# Patient Record
(demographics unavailable — no encounter records)

---

## 2024-12-23 NOTE — PHYSICAL EXAM
[de-identified] : Constitutional:  no acute distress and well-appearing Eyes:  normal sclera/conjunctiva and pupils equal round and reactive to light ENT:  the outer ears and nose were normal in appearance, the oropharynx was normal and both tympanic membranes were normal Lymph: no posterior cervical lymphadenopathy and no anterior cervical lymphadenopathy Pulmonary:  no respiratory distress, lungs were clear to auscultation bilaterally Cardiac:  normal rate, normal S1 and S2 and no murmur heard Vascular:  there was no peripheral edema Abdomen:  abdomen soft, non-tender and normal bowel sounds Genitourinary: deferred at this time Musculoskeletal:  no joint swelling and grossly normal strength/tone Skin: no rash or skin lesions visualized Neurology:  coordination grossly intact and no focal deficits Psychiatric:  the affect was normal and the mood was normal

## 2024-12-23 NOTE — HEALTH RISK ASSESSMENT
[Good] : ~his/her~  mood as  good [No falls in past year] : Patient reported no falls in the past year [0] : 2) Feeling down, depressed, or hopeless: Not at all (0) [PHQ-2 Negative - No further assessment needed] : PHQ-2 Negative - No further assessment needed [No] : In the past 12 months have you used drugs other than those required for medical reasons? No [Time Spent: ___ Minutes] : I spent [unfilled] minutes performing a depression screening for this patient. [Never] : Never [No Retinopathy] : No retinopathy [Patient reported mammogram was normal] : Patient reported mammogram was normal [Patient reported PAP Smear was normal] : Patient reported PAP Smear was normal [Patient reported colonoscopy was normal] : Patient reported colonoscopy was normal [None] : None [With Family] : lives with family [Feels Safe at Home] : Feels safe at home [Fully functional (bathing, dressing, toileting, transferring, walking, feeding)] : Fully functional (bathing, dressing, toileting, transferring, walking, feeding) [Fully functional (using the telephone, shopping, preparing meals, housekeeping, doing laundry, using] : Fully functional and needs no help or supervision to perform IADLs (using the telephone, shopping, preparing meals, housekeeping, doing laundry, using transportation, managing medications and managing finances) [Reports changes in vision] : Reports changes in vision [Retired] : retired [] :  [# Of Children ___] : has [unfilled] children [Audit-CScore] : 0 [de-identified] : walking [de-identified] : eating homecooked fruits and vegetables [[facility]] : 7 [[id number]] : 0 [EyeExamDate] : 2024 [Change in mental status noted] : No change in mental status noted [Reports changes in hearing] : Reports no changes in hearing [Reports changes in dental health] : Reports no changes in dental health [MammogramDate] : 08/24 [PapSmearDate] : 2024 [ColonoscopyDate] : 2019 [ColonoscopyComments] : normal repeat in 10 years [de-identified] : with  and two adult children [FreeTextEntry2] :

## 2024-12-23 NOTE — ASSESSMENT
[FreeTextEntry1] : 62 yo F w PMH HLD, prediabetes, OAB, ANI, glaucoma presenting to establish care and for CPE.  Return to clinic in 4-6 months to follow up for chronic conditions.  Healthcare Maintenance: Diet and exercise encouraged Medical history reviewed and updated Medication reconciliation done; refills ordered as needed Routine labs ordered Vaccinations reviewed; Tdap: 2022? UTD  Shingrix: declines until next visit  Influenza: declines this visit Mammogram: 08/24. Patient reported mammogram was normal.   PAP Smear: 2024. Patient reported PAP Smear was normal.   Colonoscopy: 2019. Patient reported colonoscopy was normal. normal repeat in 10 years.   Stay up to date with ophthalmology and dental   HLD -repeat labs today -was unable to tolerate medications for HLD in the past  Prediabetes -repeat a1c today  Glaucoma -following w ophtho -taking xiidra eyedrops  OAB/ANI -following w urology; symptoms well controlled  Thyroid nodule -incidentally found on MRI C spine for neck pain -ordered for thyroid US  Cervical Pain w Radiation to L Arm -had MRI spine w outside doctor showing degenerative changes -patient requesting new orthopedic surgery referral -using Tylenol/ibuprofen PRN -can consider gabapentin for neuropathic pain if persists and no plans for further intervention  L Rib Pain w Associated SOB -likely costochondritis -advised to take ibuprofen PRN -obtain CXR for further evaluation  Insomnia -discussed sleep hygiene and advised to maintain sleep diary

## 2024-12-23 NOTE — HISTORY OF PRESENT ILLNESS
[FreeTextEntry1] : Establish care and CPE [de-identified] : 62 yo F w PMH HLD, prediabetes, OAB, ANI, glaucoma presenting to establish care and for CPE.  present for encounter as well.  Last seen by [facility] PCP Dr. Heard; last seen in June for follow up of HLD and prediabetes Urology: following for OAB and ANI; not requiring medications Orthopedic/Pain Management: seeing for L shoulder and back pain; was getting injections; did PT in the past; now has pain in neck and some weakness/numbness in L arm; did MRI cervical spine w L thyroid nodule; was told to f/u w PCP and very concerned Has insomnia with both falling and staying asleep; lost sister to cancer recently as well Has L sided rib pain and sternal pain as well w some associated SOB when falling asleep

## 2025-01-09 NOTE — DISCUSSION/SUMMARY
[de-identified] : Left cervical radiculopathy. Discussed all options. Dizziness. No cervical spine surgery needed.  Referred to Dr. Kenzie Nunez and Dr Harris for neurological evaluation. All options discussed including rest, medicine, home exercise, acupuncture, Chiropractic care, Physical Therapy, Pain management, and last resort surgery. All questions were answered, all alternatives discussed, and the patient is in complete agreement with the treatment plan which the patient contributed to and discussed with me through the shared decision-making process. Follow-up appointment as instructed. Any issues and the patient will call or come in sooner.

## 2025-01-09 NOTE — ADDENDUM
[FreeTextEntry1] : This note was written by Carlos Hernandez on 01/09/2025 acting as scribe for Dr. Claudy Hogue M.D.  I, Claudy Hogue MD, have read and attest that all the information, medical decision making and discharge instructions within are true and accurate.

## 2025-01-09 NOTE — HISTORY OF PRESENT ILLNESS
[Stable] : stable [de-identified] : 61 year old RHD female presents for initial evaluation of neck pain x 7 months, has been worsening. Denies injury. The pain radiates down the LUE to the fingers, with numbness/tingling. States that she drops items. She also notes some gait instability. She takes advil and tylenol for the pain.  No recent PT or chiropractic care. Denies JALEN.  Has MRI Cervical.  She also complains of midsternal pain x  2 years. She notes that XRs done which were negative.  PMHx: HLD, pre-DM, HTN  She works in .  No fever, chills, sweats, nausea/vomiting. No bowel or bladder dysfunction, no recent weight loss or gain. No night pain. This history is in addition to the intake form that I personally reviewed.

## 2025-01-09 NOTE — PHYSICAL EXAM
[Normal] : Gait: normal [Romero's Sign] : negative Romero's sign [Pronator Drift] : negative pronator drift [SLR] : negative straight leg raise [de-identified] : 5 out of 5 motor strength, sensation is intact and symmetrical full range of motion flexion extension and rotation, no palpatory tenderness full range of motion of hips knees shoulders and elbows (all four extremities), no atrophy, negative straight leg raise, no pathological reflexes, no swelling, normal ambulation, no apparent distress skin is intact, no palpable lymph nodes, no upper or lower extremity instability, alert and oriented x3 and normal mood. Normal finger-to nose test.  No upper motor neuron findings. [de-identified] : I reviewed, interpreted and clinically correlated the following outside imaging studies, MRI Cervical 12/19/24  ([facility])  IMPRESSION:    Cervical disc degeneration without significant foraminal or canal stenosis at any level.   Ellipsoid T2 hyperintensity in the left thyroid lobe; correlation with thyroid ultrasound recommended.      FINDINGS:   ALIGNMENT: Anatomic.   VERTEBRAE: Vertebral body heights preserved. Marrow signal is within normal limits.   DISCS: Minimal disc degeneration.   CORD: No abnormality of cord signal or caliber.   PARAVERTEBRAL SOFT TISSUES: Ellipsoid 1.1 x 0.8 x 0.8 cm T2 hyperintensity in the left thyroid; correlation with thyroid ultrasound recommended..   EVALUATION OF INDIVIDUAL LEVELS:  C2-3: No disc herniation, canal or foraminal stenosis.   C3-4: No disc herniation, canal or foraminal stenosis.   C4-5: No disc herniation, canal or foraminal stenosis.   C5-6: No disc herniation. Minimal right uncovertebral joint osteoarthritis without foraminal stenosis. No canal stenosis.   C6-7: Subtle central disc protrusion. No foraminal or canal stenosis.   C7-T1: No disc herniation, foraminal or canal stenosis.

## 2025-01-15 NOTE — DISCUSSION/SUMMARY
[EKG obtained to assist in diagnosis and management of assessed problem(s)] : EKG obtained to assist in diagnosis and management of assessed problem(s) [FreeTextEntry1] : In summary   Jami, 61 year old with a past medical history of Hyperlipidemia, Probable Type IIA FH (FHx HL - in Mother/Father/Sister/Brother ), Pre-T2DM, Statin Intolerance to Atorva/Rosuva for > 8 wks tried twice =============== =============== Hyperlipidemia, Probable Type IIA FH (FHx HL - in Mother/Father/Sister/Brother ), Pre-T2DM, Statin Intolerance to Atorva/Rosuva for > 8 wks tried twice - Discussed Diet & Exercise - Discussed Family Screening - Start PCSK9i - Check CRP/Lpa  CP - some anginal elements - Check CTA Coronaries - CP Intermittent - difficulty walking - RFs for angina - too young for radiation induced by nuclear compared to CT   Mn Systolic Murmur (holosystolic) I.VI apical c/f MR - TTE     Li Min, kind thanks for the referral.   Jose Eduardo Mcdonough MD [facility] JUSTIN LOPEZ Director, Preventive Cardiology [facility] Cardiovascular [place]                                                                                                                                                                                                                                                                     --                                                                                                                                                                                                                                                                                                                                                                                                                                                                                                                                                                                                                                                                                                                            ----------- 60 minutes spent in patient encounter explaining and formulating rationale for treatment plan. >50% of time spent in direct counseling reviewing all tests, labs, and imaging and conferring with patient, family member, and other physicians regarding patient care

## 2025-01-15 NOTE — DISCUSSION/SUMMARY
[EKG obtained to assist in diagnosis and management of assessed problem(s)] : EKG obtained to assist in diagnosis and management of assessed problem(s) [FreeTextEntry1] : In summary   Jami, 61 year old with a past medical history of Hyperlipidemia, Probable Type IIA FH (FHx HL - in Mother/Father/Sister/Brother ), Pre-T2DM, Statin Intolerance to Atorva/Rosuva for > 8 wks tried twice =============== =============== Hyperlipidemia, Probable Type IIA FH (FHx HL - in Mother/Father/Sister/Brother ), Pre-T2DM, Statin Intolerance to Atorva/Rosuva for > 8 wks tried twice - Discussed Diet & Exercise - Discussed Family Screening - Start PCSK9i - Check CRP/Lpa  CP - some anginal elements - Check CTA Coronaries - CP Intermittent - difficulty walking - RFs for angina - too young for radiation induced by nuclear compared to CT   Mn Systolic Murmur (holosystolic) I.VI apical c/f MR - TTE     Li Min, kind thanks for the referral.   Jose Eduardo Mcdonough MD [facility] JUSTIN LOPEZ Director, Preventive Cardiology [facility] Cardiovascular [place]                                                                                                                                                                                                                                                                     --                                                                                                                                                                                                                                                                                                                                                                                                                                                                                                                                                                                                                                                                                                                            ----------- 60 minutes spent in patient encounter explaining and formulating rationale for treatment plan. >50% of time spent in direct counseling reviewing all tests, labs, and imaging and conferring with patient, family member, and other physicians regarding patient care

## 2025-01-15 NOTE — HISTORY OF PRESENT ILLNESS
[FreeTextEntry1] : Dear Patrica Romero,   I had the pleasure of seeing your patient MARGARET MORENO for Cardiometabolic evaluation.   As you know, she  is a Pleasant, 61 year old with a past medical history of Hyperlipidemia, Probable Type IIA FH (FHx HL - in Mother/Father/Sister/Brother ), Pre-T2DM, Statin Intolerance to Atorva/Rosuva for > 8 wks tried twice =============== =============== Hyperlipidemia, Probable Type IIA FH (FHx HL - in Mother/Father/Sister/Brother ), Pre-T2DM, Statin Intolerance to Atorva/Rosuva for > 8 wks tried twice - Discussed Diet & Exercise - Discussed Family Screening - Start PCSK9i - Check CRP/Lpa  CP - some anginal elements - Check CTA Coronaries - CP Intermittent - difficulty walking - RFs for angina - too young for radiation induced by nuclear compared to CT   Mn Systolic Murmur (holosystolic) I.VI apical c/f MR - TTE   CC: Cholesterol and Heart - Notes No CP/SOB/Palps/Leg swelling - Reports No F/C/N/V/Headaches - Reports Normal Exercise Tolerance - Reports normal mood/quality of life - Reports no diet changes - Reports no body aches - Reports no recent colds/viruses - Recent labs/imaging reviewed - Relevant Family history reviewed Mother/Father - CV

## 2025-02-05 NOTE — ASSESSMENT
[FreeTextEntry1] : This is a 61F who was referred by Dr. Hogue due to concern for gait changes. I do not appreciate gait issues on my exam and the patient/patient's  do not feel that she has had much change to her balance or gait recently. Her description of missing the last step going downstairs are more suggesting of inattention rather than weakness or gait instability. Additionally, her exam is reassuring and there is no signs of Parkinson's or significant neuropathy.  I reassured the patient and recommended she continue seeing Dr. Hogue for her cervical radiculopathy.

## 2025-02-05 NOTE — HISTORY OF PRESENT ILLNESS
[FreeTextEntry1] : This is a 61F with cervical radiculopathy who had seen Dr. Hogue for her neck pain (was referred to PT, starting this week) and was told that her gait seemed unsteady and was recommended to see a Neurologist.   The patient reports significant neck pain and muscle tension but doesn't believe her balance has changed or worsened all that much. She notes that when she bends down to wash her feet in the shower she feels a lot of pressure in head. Additionally, she has had a couple of instances when she has stumbled going down steps forgetting that there is one last step to walk down, but she has not had any falls. Outside of these episodes going down steps, she denies feeling imbalanced. Her  also denies feeling that there is a change to her balance or gait. She denies low back pain or numbness in the feet.  She reports some forgetfulness and severe nightmares which started after her younger sister suddenly  from grade 4 brain tumor 8 months ago. She has been sleeping extremely poorly and has high levels of anxiety.  Denies dietary changes, medication changes, or lifestyle changes recently.

## 2025-02-05 NOTE — PHYSICAL EXAM
[Person] : oriented to person [Place] : oriented to place [Time] : oriented to time [Fluency] : fluency intact [Cranial Nerves Optic (II)] : visual acuity intact bilaterally,  visual fields full to confrontation, pupils equal round and reactive to light [Cranial Nerves Oculomotor (III)] : extraocular motion intact [Cranial Nerves Trigeminal (V)] : facial sensation intact symmetrically [Cranial Nerves Facial (VII)] : face symmetrical [Cranial Nerves Vestibulocochlear (VIII)] : hearing was intact bilaterally [Cranial Nerves Glossopharyngeal (IX)] : tongue and palate midline [Cranial Nerves Accessory (XI - Cranial And Spinal)] : head turning and shoulder shrug symmetric [Cranial Nerves Hypoglossal (XII)] : there was no tongue deviation with protrusion [Motor Tone] : muscle tone was normal in all four extremities [Motor Strength] : muscle strength was normal in all four extremities [No Muscle Atrophy] : normal bulk in all four extremities [Sensation Tactile Decrease] : light touch was intact [Sensation Pain / Temperature Decrease] : pain and temperature was intact [Abnormal Walk] : normal gait [2+] : Ankle jerk left 2+ [FreeTextEntry8] : Normal stride length, base, arm swing, and turning

## 2025-04-01 NOTE — DISCUSSION/SUMMARY
[FreeTextEntry1] : In summary   Jami, 61 year old with a past medical history of Hyperlipidemia, Probable Type IIA FH (FHx HL - in Mother/Father/Sister/Brother ), Pre-T2DM, Statin Intolerance to Atorva/Rosuva for > 8 wks tried twice =============== =============== Hyperlipidemia, Probable Type IIA FH (FHx HL - in Mother/Father/Sister/Brother ), Pre-T2DM, Statin Intolerance to Atorva/Rosuva for > 8 wks tried twice - Discussed Diet & Exercise - Discussed Family Screening - PCSK9 - alternative               - Cannot tolerate PCSK9 due to jaw pain                    - > Leqvio  - Check CRP/Lpa  CP - some anginal elements - Check CTA Coronaries - CP Intermittent - difficulty walking - RFs for angina - too young for radiation induced by nuclear compared to CT   Mn Systolic Murmur (holosystolic) I.VI apical c/f MR - TTE     Li Min, kind thanks for the referral.   Jose Eduardo Mcdonough MD [facility] JUSTIN LOPEZ Director, Preventive Cardiology [facility] Cardiovascular [place]                                                                                                                                                                                                                                                                     --                                                                                                                                                                                                                                                                                                                                                                                                                                                                                                                                                                                                                                                                                                            40 minutes spent in patient encounter explaining and formulating rationale for treatment plan. >50% of time spent in direct counseling reviewing all tests, labs, and imaging and conferring with patient, family member, and other physicians regarding patient care                               ====

## 2025-04-01 NOTE — DISCUSSION/SUMMARY
[FreeTextEntry1] : In summary   Jami, 61 year old with a past medical history of Hyperlipidemia, Probable Type IIA FH (FHx HL - in Mother/Father/Sister/Brother ), Pre-T2DM, Statin Intolerance to Atorva/Rosuva for > 8 wks tried twice =============== =============== Hyperlipidemia, Probable Type IIA FH (FHx HL - in Mother/Father/Sister/Brother ), Pre-T2DM, Statin Intolerance to Atorva/Rosuva for > 8 wks tried twice - Discussed Diet & Exercise - Discussed Family Screening - PCSK9 - alternative               - Cannot tolerate PCSK9 due to jaw pain                    - > Leqvio  - Check CRP/Lpa  CP - some anginal elements - Check CTA Coronaries - CP Intermittent - difficulty walking - RFs for angina - too young for radiation induced by nuclear compared to CT   Mn Systolic Murmur (holosystolic) I.VI apical c/f MR - TTE     Li Min, kind thanks for the referral.   Jose Eduardo Mcdonough MD [facility] JUSTIN LOPEZ Director, Preventive Cardiology [facility] Cardiovascular [place]                                                                                                                                                                                                                                                                     --                                                                                                                                                                                                                                                                                                                                                                                                                                                                                                                                                                                                                                                                                                            40 minutes spent in patient encounter explaining and formulating rationale for treatment plan. >50% of time spent in direct counseling reviewing all tests, labs, and imaging and conferring with patient, family member, and other physicians regarding patient care                               ====

## 2025-04-01 NOTE — HISTORY OF PRESENT ILLNESS
[FreeTextEntry1] : Dear Patrica Romero,   I had the pleasure of seeing your patient MARGARET MORENO for Cardiometabolic evaluation.   As you know, she  is a Pleasant, 61 year old with a past medical history of Hyperlipidemia, Probable Type IIA FH (FHx HL - in Mother/Father/Sister/Brother ), Pre-T2DM, Statin Intolerance to Atorva/Rosuva for > 8 wks tried twice, CTA Cors - 2-2025 - Unable to render CAC due to artifact. Non-obstructive, but disease < 50% OM RCA =============== =============== Hyperlipidemia, Probable Type IIA FH (FHx HL - in Mother/Father/Sister/Brother ), Pre-T2DM, Statin Intolerance to Atorva/Rosuva for > 8 wks tried twice - Discussed Diet & Exercise - Discussed Family Screening - Start PCSK9i - Check CRP/Lpa  CP - some anginal elements - Check CTA Coronaries - CP Intermittent - difficulty walking - RFs for angina - too young for radiation induced by nuclear compared to CT   Mn Systolic Murmur (holosystolic) I.VI apical c/f MR - TTE   ---------------------------------------------------------------------------- ---------------------------------------------------------------------------- 3-2025 CC: Heart Issues - Patient reports no chest pain, no localization to the sternum, no radiation to the neck/jaw, no alleviating nor worsening precipitants to CP, no assoc symptoms to CP no alleviating nor worsening precipitants to CP, no assoc symptoms to CP - Patient notes no associated SOB/Palps/Leg swelling - Reports No associated F/C/N/V/Headaches - Reports Normal Exercise Tolerance - Reports no medication changes - Reports normal mood/quality of life - Reports no associated midnight awakenings from cP - Reports no diet changes - Reports no associated body aches- Reports no recent colds/viruses- Recent labs/imaging reviewed- Relevant Family history reviewed Mother/Father - CVRisk Assessment for 10 Year ACC/AHA Pooled Risk Cohort Equation places this person at < 7.5% Risk of ASCVD TTE -- 1-2025 - LVEF  Nl CTA Cors - 2-2025 - Unable to render CAC due to artifact. Non-obstructive, but disease < 50% OM RCA Unable to tolerate Praluent due to sig SEs (Jaw Pain)

## 2025-04-15 NOTE — DISCUSSION/SUMMARY
[de-identified] : MARGARET MORENO is a 61 year-old woman presenting for a NPV for a history of chronic neck pain.  Prior treatment: Physical therapy x12 visits (until 4/2025) Acetaminophen  OTC NSAIDs Patient has participated and failed at least 6 weeks of conservative therapy including physical therapy and a prescribed home exercise program as well as 6 weeks of activity modifications, including heat, ice, rest, and over the counter medications.  Plan: 1) MRI cervical spine images reviewed with the patient. 2) Discussed TPI, patient declined 3) Trial lidocaine 5% patches 4) Trial diclofenac 50mg BID prn; Discussed the risks of NSAIDs, including worsening HTN, cardiovascular risks, GI risk, renal injury. The patient was told not to take other NSAIDs with this and to consult with their PCP if there is a significant medical history to warrant this prior to initiating treatment. 5) Continue physical therapy - script provided 6) RTC 2 months

## 2025-04-15 NOTE — DISCUSSION/SUMMARY
[de-identified] : MARGARET MORENO is a 61 year-old woman presenting for a NPV for a history of chronic neck pain.  Prior treatment: Physical therapy x12 visits (until 4/2025) Acetaminophen  OTC NSAIDs Patient has participated and failed at least 6 weeks of conservative therapy including physical therapy and a prescribed home exercise program as well as 6 weeks of activity modifications, including heat, ice, rest, and over the counter medications.  Plan: 1) MRI cervical spine images reviewed with the patient. 2) Discussed TPI, patient declined 3) Trial lidocaine 5% patches 4) Trial diclofenac 50mg BID prn; Discussed the risks of NSAIDs, including worsening HTN, cardiovascular risks, GI risk, renal injury. The patient was told not to take other NSAIDs with this and to consult with their PCP if there is a significant medical history to warrant this prior to initiating treatment. 5) Continue physical therapy - script provided 6) RTC 2 months

## 2025-04-15 NOTE — DATA REVIEWED
[MRI] : MRI [Cervical Spine] : cervical spine [Report was reviewed and noted in the chart] : The report was reviewed and noted in the chart [FreeTextEntry1] : MRI Cervical 12/19/24 ([facility]) IMPRESSION:  Cervical disc degeneration without significant foraminal or canal stenosis at any level.  Ellipsoid T2 hyperintensity in the left thyroid lobe; correlation with thyroid ultrasound recommended.    FINDINGS:  ALIGNMENT: Anatomic.  VERTEBRAE: Vertebral body heights preserved. Marrow signal is within normal limits.  DISCS: Minimal disc degeneration.  CORD: No abnormality of cord signal or caliber.  PARAVERTEBRAL SOFT TISSUES: Ellipsoid 1.1 x 0.8 x 0.8 cm T2 hyperintensity in the left thyroid; correlation with thyroid ultrasound recommended..  EVALUATION OF INDIVIDUAL LEVELS: C2-3: No disc herniation, canal or foraminal stenosis.  C3-4: No disc herniation, canal or foraminal stenosis.  C4-5: No disc herniation, canal or foraminal stenosis.  C5-6: No disc herniation. Minimal right uncovertebral joint osteoarthritis without foraminal stenosis. No canal stenosis.  C6-7: Subtle central disc protrusion. No foraminal or canal stenosis.  C7-T1: No disc herniation, foraminal or canal stenosis.

## 2025-04-15 NOTE — HISTORY OF PRESENT ILLNESS
[Neck] : neck [Upper back] : upper back [Left Arm] : left arm [Right Arm] : right arm [Dull/Aching] : dull/aching [Tingling] : tingling [] : yes [Frequent] : frequent [Household chores] : household chores [Leisure] : leisure [Sleep] : sleep [Social interactions] : social interactions [Physical therapy] : physical therapy [7] : 7 [5] : 5 [FreeTextEntry1] : 4/15/2025: MARGARET MORENO is a 61 year-old woman presenting for a NPV for a history of chronic neck pain. The patient notes having this pain for the past year. The patient has been doing physical therapy, which helped significantly with pain and range of motion. Has also been taking acetaminophen and ibuprofen prn. She completed 12 visits over 2 months, completed in April 2025. At this point, patient notes pain in the cervical paraspinal region w/ radiation to the suprascapular region bilaterally as well as into the occiput. Notes that she gets headaches from this. Also notes radiation to the left posterior arm and forearm to the hand with some tingling/numbness.  The patient states that average pain over the past week was 6/10 in severity. Mood: Patient notes some depression, denies anxiety.  Sleep: Notes significant difficulty with sleep 2/2 pain.  [FreeTextEntry6] : sensitive to touch  [FreeTextEntry7] : b/l arm [de-identified] : getting out of bed, after lifting

## 2025-04-15 NOTE — PHYSICAL EXAM
[de-identified] : Gen: NAD Head: NC/AT Neck: +paraspinal TTP, limited rotation to the left, limited extension  Eyes: no glasses, no scleral icterus ENT: mucous membranes moist CV: no JVD Lungs: nonlabored breathing Abd: soft, NT/ND Ext: full ROM in all extremities, no peripheral edema Neuro: CN intact UEs +5 L +5 R shoulder abduction +5 L +5 R arm abduction +5 L +5 R forearm flexion +5 L +5 R forearm extension +5 L +5 R finger flexion +5 L +5 R  strength Psych: normal affect Skin: no visible lesions

## 2025-04-15 NOTE — HISTORY OF PRESENT ILLNESS
[Neck] : neck [Upper back] : upper back [Left Arm] : left arm [Right Arm] : right arm [Dull/Aching] : dull/aching [Tingling] : tingling [] : yes [Frequent] : frequent [Household chores] : household chores [Leisure] : leisure [Sleep] : sleep [Social interactions] : social interactions [Physical therapy] : physical therapy [7] : 7 [5] : 5 [FreeTextEntry1] : 4/15/2025: MARGARET MORENO is a 61 year-old woman presenting for a NPV for a history of chronic neck pain. The patient notes having this pain for the past year. The patient has been doing physical therapy, which helped significantly with pain and range of motion. Has also been taking acetaminophen and ibuprofen prn. She completed 12 visits over 2 months, completed in April 2025. At this point, patient notes pain in the cervical paraspinal region w/ radiation to the suprascapular region bilaterally as well as into the occiput. Notes that she gets headaches from this. Also notes radiation to the left posterior arm and forearm to the hand with some tingling/numbness.  The patient states that average pain over the past week was 6/10 in severity. Mood: Patient notes some depression, denies anxiety.  Sleep: Notes significant difficulty with sleep 2/2 pain.  [FreeTextEntry6] : sensitive to touch  [FreeTextEntry7] : b/l arm [de-identified] : getting out of bed, after lifting

## 2025-04-15 NOTE — PHYSICAL EXAM
[de-identified] : Gen: NAD Head: NC/AT Neck: +paraspinal TTP, limited rotation to the left, limited extension  Eyes: no glasses, no scleral icterus ENT: mucous membranes moist CV: no JVD Lungs: nonlabored breathing Abd: soft, NT/ND Ext: full ROM in all extremities, no peripheral edema Neuro: CN intact UEs +5 L +5 R shoulder abduction +5 L +5 R arm abduction +5 L +5 R forearm flexion +5 L +5 R forearm extension +5 L +5 R finger flexion +5 L +5 R  strength Psych: normal affect Skin: no visible lesions

## 2025-06-13 NOTE — HISTORY OF PRESENT ILLNESS
[FreeTextEntry1] : 06/16/2025 MARGARET MORENO is a 61 year-old woman presenting for a RPV for a history of     The patient states that average pain over the past week was /10 in severity.   Mood: Sleep:  4/15/2025: MARGARET MORENO is a 61 year-old woman presenting for a NPV for a history of chronic neck pain. The patient notes having this pain for the past year. The patient has been doing physical therapy, which helped significantly with pain and range of motion. Has also been taking acetaminophen and ibuprofen prn. She completed 12 visits over 2 months, completed in April 2025. At this point, patient notes pain in the cervical paraspinal region w/ radiation to the suprascapular region bilaterally as well as into the occiput. Notes that she gets headaches from this. Also notes radiation to the left posterior arm and forearm to the hand with some tingling/numbness.  The patient states that average pain over the past week was 6/10 in severity. Mood: Patient notes some depression, denies anxiety.  Sleep: Notes significant difficulty with sleep 2/2 pain.   [Neck] : neck [Upper back] : upper back [Left Arm] : left arm [Right Arm] : right arm [7] : 7 [5] : 5 [Dull/Aching] : dull/aching [Tingling] : tingling [] : yes [Frequent] : frequent [Household chores] : household chores [Leisure] : leisure [Sleep] : sleep [Social interactions] : social interactions [Physical therapy] : physical therapy [FreeTextEntry6] : sensitive to touch  [FreeTextEntry7] : b/l arm [de-identified] : getting out of bed, after lifting

## 2025-06-13 NOTE — PHYSICAL EXAM
[de-identified] : Gen: NAD Head: NC/AT Neck: +paraspinal TTP, limited rotation to the left, limited extension  Eyes: no glasses, no scleral icterus ENT: mucous membranes moist CV: no JVD Lungs: nonlabored breathing Abd: soft, NT/ND Ext: full ROM in all extremities, no peripheral edema Neuro: CN intact UEs +5 L +5 R shoulder abduction +5 L +5 R arm abduction +5 L +5 R forearm flexion +5 L +5 R forearm extension +5 L +5 R finger flexion +5 L +5 R  strength Psych: normal affect Skin: no visible lesions

## 2025-06-13 NOTE — DISCUSSION/SUMMARY
[de-identified] : MARGARET MORENO is a 61 year-old woman presenting for a NPV for a history of chronic neck pain.  Prior treatment: Physical therapy x12 visits (until 4/2025) Acetaminophen  OTC NSAIDs Patient has participated and failed at least 6 weeks of conservative therapy including physical therapy and a prescribed home exercise program as well as 6 weeks of activity modifications, including heat, ice, rest, and over the counter medications.  Plan: 1) MRI cervical spine images reviewed with the patient. 2) Discussed TPI, patient declined 3) Trial lidocaine 5% patches 4) Trial diclofenac 50mg BID prn; Discussed the risks of NSAIDs, including worsening HTN, cardiovascular risks, GI risk, renal injury. The patient was told not to take other NSAIDs with this and to consult with their PCP if there is a significant medical history to warrant this prior to initiating treatment. 5) Continue physical therapy - script provided 6) RTC 2 months

## 2025-06-13 NOTE — DISCUSSION/SUMMARY
[de-identified] : MARGARET MORENO is a 61 year-old woman presenting for a NPV for a history of chronic neck pain.  Prior treatment: Physical therapy x12 visits (until 4/2025) Acetaminophen  OTC NSAIDs Patient has participated and failed at least 6 weeks of conservative therapy including physical therapy and a prescribed home exercise program as well as 6 weeks of activity modifications, including heat, ice, rest, and over the counter medications.  Plan: 1) MRI cervical spine images reviewed with the patient. 2) Discussed TPI, patient declined 3) Trial lidocaine 5% patches 4) Trial diclofenac 50mg BID prn; Discussed the risks of NSAIDs, including worsening HTN, cardiovascular risks, GI risk, renal injury. The patient was told not to take other NSAIDs with this and to consult with their PCP if there is a significant medical history to warrant this prior to initiating treatment. 5) Continue physical therapy - script provided 6) RTC 2 months

## 2025-06-13 NOTE — HISTORY OF PRESENT ILLNESS
[FreeTextEntry1] : 06/16/2025 MARGARET MORENO is a 61 year-old woman presenting for a RPV for a history of     The patient states that average pain over the past week was /10 in severity.   Mood: Sleep:  4/15/2025: MARGARET MORENO is a 61 year-old woman presenting for a NPV for a history of chronic neck pain. The patient notes having this pain for the past year. The patient has been doing physical therapy, which helped significantly with pain and range of motion. Has also been taking acetaminophen and ibuprofen prn. She completed 12 visits over 2 months, completed in April 2025. At this point, patient notes pain in the cervical paraspinal region w/ radiation to the suprascapular region bilaterally as well as into the occiput. Notes that she gets headaches from this. Also notes radiation to the left posterior arm and forearm to the hand with some tingling/numbness.  The patient states that average pain over the past week was 6/10 in severity. Mood: Patient notes some depression, denies anxiety.  Sleep: Notes significant difficulty with sleep 2/2 pain.   [Neck] : neck [Upper back] : upper back [Left Arm] : left arm [Right Arm] : right arm [7] : 7 [5] : 5 [Dull/Aching] : dull/aching [Tingling] : tingling [] : yes [Frequent] : frequent [Household chores] : household chores [Leisure] : leisure [Sleep] : sleep [Social interactions] : social interactions [Physical therapy] : physical therapy [FreeTextEntry6] : sensitive to touch  [FreeTextEntry7] : b/l arm [de-identified] : getting out of bed, after lifting

## 2025-06-13 NOTE — PHYSICAL EXAM
[de-identified] : Gen: NAD Head: NC/AT Neck: +paraspinal TTP, limited rotation to the left, limited extension  Eyes: no glasses, no scleral icterus ENT: mucous membranes moist CV: no JVD Lungs: nonlabored breathing Abd: soft, NT/ND Ext: full ROM in all extremities, no peripheral edema Neuro: CN intact UEs +5 L +5 R shoulder abduction +5 L +5 R arm abduction +5 L +5 R forearm flexion +5 L +5 R forearm extension +5 L +5 R finger flexion +5 L +5 R  strength Psych: normal affect Skin: no visible lesions

## 2025-06-25 NOTE — ASSESSMENT
[FreeTextEntry1] : 62 yo F w PMH HLD, prediabetes, OAB, ANI, glaucoma presenting for follow up.  Return to clinic in 6 months for CPE.   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 and PCSK9 due to jaw pain -ordered for leqvio but has not started due to insurance issues  Prediabetes -most recent a1c 5.8  Glaucoma -following w ophtho; taking xiidra eyedrops  OAB/ANI -following w urology; symptoms well controlled  Thyroid nodule -incidentally found on MRI C spine for neck pain -thyroid US w [facility]; can repeat Jan 2026  Cervical Pain w Radiation to L Arm -had MRI spine w outside doctor showing degenerative changes -following w orthopedics and pain management -using Tylenol/celebrex PRN -doing trial of PT which helps somewhat -was considered for steroid injection but refused in the past  Healthcare Maintenance: Diet and exercise encouraged Medical history reviewed and updated Medication reconciliation done; refills ordered as needed Vaccinations reviewed; Tdap: 2022? UTD  Shingrix: declines until next visit  Mammogram: 08/24. Patient reported mammogram was normal.   PAP Smear: 2024. Patient reported PAP Smear was normal.   Colonoscopy: 2019. Patient reported colonoscopy was normal. normal repeat in 10 years.   Stay up to date with ophthalmology and dental

## 2025-06-25 NOTE — HISTORY OF PRESENT ILLNESS
[FreeTextEntry1] : Follow Up [de-identified] : 62 yo F w PMH HLD, prediabetes, OAB, ANI, glaucoma presenting for follow up.  present for encounter as well.  Saw cardiology; started on repatha but couldnt tolerate; plans to start on leqvio; got EKG, ECHO, CT coronary Saw ortho for cervical radiculopathy; trial of PT; pain goes from neck to shoulder and fingertip Taking celebrex which helps and the patch doesnt work  Two weeks ago fell and hit a chair on her L cheek Pain was shooting but now is dull pain Now has pain in eyes but no acute changes to vision Went to eye doctor but was not able to see the doctor Pain is better w celebrex; denies fevers, chills, recent sinus infections

## 2025-06-25 NOTE — PHYSICAL EXAM
[No Edema] : there was no peripheral edema [Soft] : abdomen soft [Non Tender] : non-tender [Normal Bowel Sounds] : normal bowel sounds [Normal] : affect was normal and insight and judgment were intact

## 2025-06-26 NOTE — PHYSICAL EXAM
[de-identified] : Gen: NAD Head: NC/AT Neck: +paraspinal TTP, limited rotation to the left, limited extension  Eyes: no glasses, no scleral icterus ENT: mucous membranes moist CV: no JVD Lungs: nonlabored breathing Abd: soft, NT/ND Ext: full ROM in all extremities, no peripheral edema Neuro: CN intact UEs +5 L +5 R shoulder abduction +5 L +5 R arm abduction +5 L +5 R forearm flexion +5 L +5 R forearm extension +5 L +5 R finger flexion +5 L +5 R  strength Psych: normal affect Skin: no visible lesions

## 2025-06-26 NOTE — HISTORY OF PRESENT ILLNESS
[Neck] : neck [Upper back] : upper back [Left Arm] : left arm [Right Arm] : right arm [7] : 7 [5] : 5 [Dull/Aching] : dull/aching [Tingling] : tingling [] : yes [Frequent] : frequent [Household chores] : household chores [Leisure] : leisure [Sleep] : sleep [Social interactions] : social interactions [Physical therapy] : physical therapy [6] : 6 [Constant] : constant [FreeTextEntry1] : 6/26/2025: MARGARET MORENO is a 61 year-old woman presenting for a RPV for a history of chronic neck pain. Patient continues to have pain in the left neck w/ radiation to the left posterior arm and forearm into the hand. Notes tingling and numbness, no weakness. Has been taking celecoxib 200mg, which helps to some degree with pain.  The patient states that average pain over the past week was 6/10 in severity. Mood: Patient notes some depression, denies anxiety.  Sleep: Notes significant difficulty with sleep 2/2 pain.   6/16/2025 MARGARET MORENO is a 61 year-old woman presenting for a RPV for a history of chronic neck pain. Notes progressive radiation of neck pain to the left posterior arm and dorsal forearm and hang. Notes tingling and numbness. Notes some difficulty with fine motor movement of the left hand. Did start diclofenac but notes that this caused her to have hives. Lidocaine patches are helping. Also notes some pain in the left low back w/ radiation to the posterior thigh.  The patient states that average pain over the past week was 6/10 in severity. Mood: Patient notes some depression, denies anxiety.  Sleep: Notes significant difficulty with sleep 2/2 pain.   4/15/2025: MARGARET MORENO is a 61 year-old woman presenting for a NPV for a history of chronic neck pain. The patient notes having this pain for the past year. The patient has been doing physical therapy, which helped significantly with pain and range of motion. Has also been taking acetaminophen and ibuprofen prn. She completed 12 visits over 2 months, completed in April 2025. At this point, patient notes pain in the cervical paraspinal region w/ radiation to the suprascapular region bilaterally as well as into the occiput. Notes that she gets headaches from this. Also notes radiation to the left posterior arm and forearm to the hand with some tingling/numbness.  The patient states that average pain over the past week was 6/10 in severity. Mood: Patient notes some depression, denies anxiety.  Sleep: Notes significant difficulty with sleep 2/2 pain.    [FreeTextEntry6] : sensitive to touch  [FreeTextEntry7] : b/l arm [de-identified] : getting out of bed, after lifting

## 2025-06-26 NOTE — DISCUSSION/SUMMARY
[de-identified] : MARGARET MORENO is a 61 year-old woman presenting for a RPV for a history of chronic neck pain.  Prior treatment: Physical therapy x12 visits (until 4/2025) Acetaminophen  OTC NSAIDs Diclofenac - hives Patient has participated and failed at least 6 weeks of conservative therapy including physical therapy and a prescribed home exercise program as well as 6 weeks of activity modifications, including heat, ice, rest, and over the counter medications.  Plan: 1) MRI cervical spine images reviewed with the patient. 2) EMG results reviewed - LEFT sided C6 C7 radiculopathy  3) Obtain new MRI cervical spine, as EMG shows clear radiculopathy 4) Refill lidocaine 5% patches 5) Continue celecoxib 200mg daily prn; denies AEs 6) Continue home exercise regimen 7) RTC post MRI

## 2025-06-26 NOTE — HISTORY OF PRESENT ILLNESS
[Neck] : neck [Upper back] : upper back [Left Arm] : left arm [Right Arm] : right arm [7] : 7 [5] : 5 [Dull/Aching] : dull/aching [Tingling] : tingling [] : yes [Frequent] : frequent [Household chores] : household chores [Leisure] : leisure [Sleep] : sleep [Social interactions] : social interactions [Physical therapy] : physical therapy [6] : 6 [Constant] : constant [FreeTextEntry1] : 6/26/2025: MARGARET MORENO is a 61 year-old woman presenting for a RPV for a history of chronic neck pain. Patient continues to have pain in the left neck w/ radiation to the left posterior arm and forearm into the hand. Notes tingling and numbness, no weakness. Has been taking celecoxib 200mg, which helps to some degree with pain.  The patient states that average pain over the past week was 6/10 in severity. Mood: Patient notes some depression, denies anxiety.  Sleep: Notes significant difficulty with sleep 2/2 pain.   6/16/2025 MARGARET MORENO is a 61 year-old woman presenting for a RPV for a history of chronic neck pain. Notes progressive radiation of neck pain to the left posterior arm and dorsal forearm and hang. Notes tingling and numbness. Notes some difficulty with fine motor movement of the left hand. Did start diclofenac but notes that this caused her to have hives. Lidocaine patches are helping. Also notes some pain in the left low back w/ radiation to the posterior thigh.  The patient states that average pain over the past week was 6/10 in severity. Mood: Patient notes some depression, denies anxiety.  Sleep: Notes significant difficulty with sleep 2/2 pain.   4/15/2025: MARGARET MORENO is a 61 year-old woman presenting for a NPV for a history of chronic neck pain. The patient notes having this pain for the past year. The patient has been doing physical therapy, which helped significantly with pain and range of motion. Has also been taking acetaminophen and ibuprofen prn. She completed 12 visits over 2 months, completed in April 2025. At this point, patient notes pain in the cervical paraspinal region w/ radiation to the suprascapular region bilaterally as well as into the occiput. Notes that she gets headaches from this. Also notes radiation to the left posterior arm and forearm to the hand with some tingling/numbness.  The patient states that average pain over the past week was 6/10 in severity. Mood: Patient notes some depression, denies anxiety.  Sleep: Notes significant difficulty with sleep 2/2 pain.    [FreeTextEntry6] : sensitive to touch  [FreeTextEntry7] : b/l arm [de-identified] : getting out of bed, after lifting

## 2025-06-26 NOTE — DATA REVIEWED
[EMG Nerve Conduction] : A EMG Nerve Conduction test was completed of the [Upper extremity] : upper extremity [Positive] : positive [Consistent with radiculopathy] : consistent with radiculopathy [MRI] : MRI [Cervical Spine] : cervical spine [Report was reviewed and noted in the chart] : The report was reviewed and noted in the chart [FreeTextEntry2] : 6/19/2025: LEFT sided C6 C7 radiculopathy [FreeTextEntry1] : MRI Cervical 12/19/24 ([facility]) IMPRESSION:  Cervical disc degeneration without significant foraminal or canal stenosis at any level.  Ellipsoid T2 hyperintensity in the left thyroid lobe; correlation with thyroid ultrasound recommended.    FINDINGS:  ALIGNMENT: Anatomic.  VERTEBRAE: Vertebral body heights preserved. Marrow signal is within normal limits.  DISCS: Minimal disc degeneration.  CORD: No abnormality of cord signal or caliber.  PARAVERTEBRAL SOFT TISSUES: Ellipsoid 1.1 x 0.8 x 0.8 cm T2 hyperintensity in the left thyroid; correlation with thyroid ultrasound recommended..  EVALUATION OF INDIVIDUAL LEVELS: C2-3: No disc herniation, canal or foraminal stenosis.  C3-4: No disc herniation, canal or foraminal stenosis.  C4-5: No disc herniation, canal or foraminal stenosis.  C5-6: No disc herniation. Minimal right uncovertebral joint osteoarthritis without foraminal stenosis. No canal stenosis.  C6-7: Subtle central disc protrusion. No foraminal or canal stenosis.  C7-T1: No disc herniation, foraminal or canal stenosis.

## 2025-06-26 NOTE — DISCUSSION/SUMMARY
[de-identified] : MARGARET MORENO is a 61 year-old woman presenting for a RPV for a history of chronic neck pain.  Prior treatment: Physical therapy x12 visits (until 4/2025) Acetaminophen  OTC NSAIDs Diclofenac - hives Patient has participated and failed at least 6 weeks of conservative therapy including physical therapy and a prescribed home exercise program as well as 6 weeks of activity modifications, including heat, ice, rest, and over the counter medications.  Plan: 1) MRI cervical spine images reviewed with the patient. 2) EMG results reviewed - LEFT sided C6 C7 radiculopathy  3) Obtain new MRI cervical spine, as EMG shows clear radiculopathy 4) Refill lidocaine 5% patches 5) Continue celecoxib 200mg daily prn; denies AEs 6) Continue home exercise regimen 7) RTC post MRI

## 2025-06-26 NOTE — PHYSICAL EXAM
[de-identified] : Gen: NAD Head: NC/AT Neck: +paraspinal TTP, limited rotation to the left, limited extension  Eyes: no glasses, no scleral icterus ENT: mucous membranes moist CV: no JVD Lungs: nonlabored breathing Abd: soft, NT/ND Ext: full ROM in all extremities, no peripheral edema Neuro: CN intact UEs +5 L +5 R shoulder abduction +5 L +5 R arm abduction +5 L +5 R forearm flexion +5 L +5 R forearm extension +5 L +5 R finger flexion +5 L +5 R  strength Psych: normal affect Skin: no visible lesions

## 2025-07-14 NOTE — DATA REVIEWED
[EMG Nerve Conduction] : A EMG Nerve Conduction test was completed of the [Upper extremity] : upper extremity [Positive] : positive [Consistent with radiculopathy] : consistent with radiculopathy [MRI] : MRI [Cervical Spine] : cervical spine [Report was reviewed and noted in the chart] : The report was reviewed and noted in the chart [FreeTextEntry2] : 6/19/2025: LEFT sided C6 C7 radiculopathy [FreeTextEntry1] : 7/7/2025 MRI Cervical Spine O&C C6-C7: slight posterior central disc bulging effacing the thecal sac  MRI Cervical 12/19/24 ([facility]) IMPRESSION:  Cervical disc degeneration without significant foraminal or canal stenosis at any level.  Ellipsoid T2 hyperintensity in the left thyroid lobe; correlation with thyroid ultrasound recommended.    FINDINGS:  ALIGNMENT: Anatomic.  VERTEBRAE: Vertebral body heights preserved. Marrow signal is within normal limits.  DISCS: Minimal disc degeneration.  CORD: No abnormality of cord signal or caliber.  PARAVERTEBRAL SOFT TISSUES: Ellipsoid 1.1 x 0.8 x 0.8 cm T2 hyperintensity in the left thyroid; correlation with thyroid ultrasound recommended..  EVALUATION OF INDIVIDUAL LEVELS: C2-3: No disc herniation, canal or foraminal stenosis.  C3-4: No disc herniation, canal or foraminal stenosis.  C4-5: No disc herniation, canal or foraminal stenosis.  C5-6: No disc herniation. Minimal right uncovertebral joint osteoarthritis without foraminal stenosis. No canal stenosis.  C6-7: Subtle central disc protrusion. No foraminal or canal stenosis.  C7-T1: No disc herniation, foraminal or canal stenosis.

## 2025-07-14 NOTE — DISCUSSION/SUMMARY
[de-identified] : MARGARET MORENO is a 61 year-old woman presenting for a RPV for a history of chronic neck pain.  Prior treatment: Physical therapy x12 visits (until 4/2025) Acetaminophen  OTC NSAIDs Diclofenac - hives Patient has participated and failed at least 6 weeks of conservative therapy including physical therapy and a prescribed home exercise program as well as 6 weeks of activity modifications, including heat, ice, rest, and over the counter medications.  Plan: 1) MRI cervical spine images reviewed with the patient. 2) EMG results reviewed - LEFT sided C6 C7 radiculopathy  3) Discussed C7-T1 VERONICA w/o MAC. The procedure was explained to the patient in detail. Reviewed risks, benefits, and alternatives with the patient. Some risks discussed included temporary increase in pain, bleeding, infection, and side effects from steroids. The patient expressed understanding and would like to defer. 4) Continue lidocaine 5% patches 5) Continue celecoxib 200mg daily prn; denies AEs 6) Continue home exercise regimen 7) RTC prn

## 2025-07-14 NOTE — PHYSICAL EXAM
[de-identified] : Gen: NAD Head: NC/AT Neck: +paraspinal TTP, limited rotation to the left, limited extension, +spurling's on the LEFT Eyes: no glasses, no scleral icterus ENT: mucous membranes moist CV: no JVD Lungs: nonlabored breathing Abd: soft, NT/ND Ext: full ROM in all extremities, no peripheral edema Neuro: CN intact UEs +5 L +5 R shoulder abduction +5 L +5 R arm abduction +5 L +5 R forearm flexion +5 L +5 R forearm extension +5 L +5 R finger flexion +5 L +5 R  strength Psych: normal affect Skin: no visible lesions

## 2025-07-14 NOTE — HISTORY OF PRESENT ILLNESS
[Neck] : neck [Upper back] : upper back [Left Arm] : left arm [Right Arm] : right arm [6] : 6 [Dull/Aching] : dull/aching [Tingling] : tingling [] : yes [Constant] : constant [Household chores] : household chores [Leisure] : leisure [Sleep] : sleep [Social interactions] : social interactions [Physical therapy] : physical therapy [5] : 5 [FreeTextEntry1] : 7/14/2025: MARGARET MORENO is a 61 year-old woman presenting for a RPV for a history of chronic neck pain. Still notes some pain in the left dorsal hand and forearm. Notes tingling and numbness and has occasional weakness. She notes that she occasionally drops things with her left hand.  The patient states that average pain over the past week was 5/10 in severity. Mood: Patient notes some depression, denies anxiety.  Sleep: Notes significant difficulty with sleep 2/2 pain.    6/26/2025: MARGARET MORENO is a 61 year-old woman presenting for a RPV for a history of chronic neck pain. Patient continues to have pain in the left neck w/ radiation to the left posterior arm and forearm into the hand. Notes tingling and numbness, no weakness. Has been taking celecoxib 200mg, which helps to some degree with pain.  The patient states that average pain over the past week was 6/10 in severity. Mood: Patient notes some depression, denies anxiety.  Sleep: Notes significant difficulty with sleep 2/2 pain.   6/16/2025 MARGARET MORENO is a 61 year-old woman presenting for a RPV for a history of chronic neck pain. Notes progressive radiation of neck pain to the left posterior arm and dorsal forearm and hang. Notes tingling and numbness. Notes some difficulty with fine motor movement of the left hand. Did start diclofenac but notes that this caused her to have hives. Lidocaine patches are helping. Also notes some pain in the left low back w/ radiation to the posterior thigh.  The patient states that average pain over the past week was 6/10 in severity. Mood: Patient notes some depression, denies anxiety.  Sleep: Notes significant difficulty with sleep 2/2 pain.   4/15/2025: MARGARET MORENO is a 61 year-old woman presenting for a NPV for a history of chronic neck pain. The patient notes having this pain for the past year. The patient has been doing physical therapy, which helped significantly with pain and range of motion. Has also been taking acetaminophen and ibuprofen prn. She completed 12 visits over 2 months, completed in April 2025. At this point, patient notes pain in the cervical paraspinal region w/ radiation to the suprascapular region bilaterally as well as into the occiput. Notes that she gets headaches from this. Also notes radiation to the left posterior arm and forearm to the hand with some tingling/numbness.  The patient states that average pain over the past week was 6/10 in severity. Mood: Patient notes some depression, denies anxiety.  Sleep: Notes significant difficulty with sleep 2/2 pain.     [FreeTextEntry6] : sensitive to touch  [FreeTextEntry7] : b/l arm [de-identified] : getting out of bed, after lifting

## 2025-07-14 NOTE — PHYSICAL EXAM
[de-identified] : Gen: NAD Head: NC/AT Neck: +paraspinal TTP, limited rotation to the left, limited extension, +spurling's on the LEFT Eyes: no glasses, no scleral icterus ENT: mucous membranes moist CV: no JVD Lungs: nonlabored breathing Abd: soft, NT/ND Ext: full ROM in all extremities, no peripheral edema Neuro: CN intact UEs +5 L +5 R shoulder abduction +5 L +5 R arm abduction +5 L +5 R forearm flexion +5 L +5 R forearm extension +5 L +5 R finger flexion +5 L +5 R  strength Psych: normal affect Skin: no visible lesions

## 2025-07-14 NOTE — HISTORY OF PRESENT ILLNESS
[Neck] : neck [Upper back] : upper back [Left Arm] : left arm [Right Arm] : right arm [6] : 6 [Dull/Aching] : dull/aching [Tingling] : tingling [] : yes [Constant] : constant [Household chores] : household chores [Leisure] : leisure [Sleep] : sleep [Social interactions] : social interactions [Physical therapy] : physical therapy [5] : 5 [FreeTextEntry1] : 7/14/2025: MARGARET MORENO is a 61 year-old woman presenting for a RPV for a history of chronic neck pain. Still notes some pain in the left dorsal hand and forearm. Notes tingling and numbness and has occasional weakness. She notes that she occasionally drops things with her left hand.  The patient states that average pain over the past week was 5/10 in severity. Mood: Patient notes some depression, denies anxiety.  Sleep: Notes significant difficulty with sleep 2/2 pain.    6/26/2025: MARGARET MORENO is a 61 year-old woman presenting for a RPV for a history of chronic neck pain. Patient continues to have pain in the left neck w/ radiation to the left posterior arm and forearm into the hand. Notes tingling and numbness, no weakness. Has been taking celecoxib 200mg, which helps to some degree with pain.  The patient states that average pain over the past week was 6/10 in severity. Mood: Patient notes some depression, denies anxiety.  Sleep: Notes significant difficulty with sleep 2/2 pain.   6/16/2025 MARGARET MORENO is a 61 year-old woman presenting for a RPV for a history of chronic neck pain. Notes progressive radiation of neck pain to the left posterior arm and dorsal forearm and hang. Notes tingling and numbness. Notes some difficulty with fine motor movement of the left hand. Did start diclofenac but notes that this caused her to have hives. Lidocaine patches are helping. Also notes some pain in the left low back w/ radiation to the posterior thigh.  The patient states that average pain over the past week was 6/10 in severity. Mood: Patient notes some depression, denies anxiety.  Sleep: Notes significant difficulty with sleep 2/2 pain.   4/15/2025: MARGARET MORENO is a 61 year-old woman presenting for a NPV for a history of chronic neck pain. The patient notes having this pain for the past year. The patient has been doing physical therapy, which helped significantly with pain and range of motion. Has also been taking acetaminophen and ibuprofen prn. She completed 12 visits over 2 months, completed in April 2025. At this point, patient notes pain in the cervical paraspinal region w/ radiation to the suprascapular region bilaterally as well as into the occiput. Notes that she gets headaches from this. Also notes radiation to the left posterior arm and forearm to the hand with some tingling/numbness.  The patient states that average pain over the past week was 6/10 in severity. Mood: Patient notes some depression, denies anxiety.  Sleep: Notes significant difficulty with sleep 2/2 pain.     [FreeTextEntry6] : sensitive to touch  [FreeTextEntry7] : b/l arm [de-identified] : getting out of bed, after lifting

## 2025-07-14 NOTE — DISCUSSION/SUMMARY
[de-identified] : MARGARET MORENO is a 61 year-old woman presenting for a RPV for a history of chronic neck pain.  Prior treatment: Physical therapy x12 visits (until 4/2025) Acetaminophen  OTC NSAIDs Diclofenac - hives Patient has participated and failed at least 6 weeks of conservative therapy including physical therapy and a prescribed home exercise program as well as 6 weeks of activity modifications, including heat, ice, rest, and over the counter medications.  Plan: 1) MRI cervical spine images reviewed with the patient. 2) EMG results reviewed - LEFT sided C6 C7 radiculopathy  3) Discussed C7-T1 VERONICA w/o MAC. The procedure was explained to the patient in detail. Reviewed risks, benefits, and alternatives with the patient. Some risks discussed included temporary increase in pain, bleeding, infection, and side effects from steroids. The patient expressed understanding and would like to defer. 4) Continue lidocaine 5% patches 5) Continue celecoxib 200mg daily prn; denies AEs 6) Continue home exercise regimen 7) RTC prn